# Patient Record
Sex: FEMALE | Race: BLACK OR AFRICAN AMERICAN
[De-identification: names, ages, dates, MRNs, and addresses within clinical notes are randomized per-mention and may not be internally consistent; named-entity substitution may affect disease eponyms.]

---

## 2023-03-06 ENCOUNTER — HOSPITAL ENCOUNTER (EMERGENCY)
Dept: HOSPITAL 26 - MED | Age: 58
Discharge: HOME | End: 2023-03-06
Payer: COMMERCIAL

## 2023-03-06 VITALS — BODY MASS INDEX: 18.96 KG/M2 | HEIGHT: 66 IN | WEIGHT: 118 LBS

## 2023-03-06 VITALS — DIASTOLIC BLOOD PRESSURE: 77 MMHG | SYSTOLIC BLOOD PRESSURE: 140 MMHG
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
## 2023-03-06 DIAGNOSIS — N39.0: ICD-10-CM

## 2023-03-06 DIAGNOSIS — S39.012A: Primary | ICD-10-CM

## 2023-03-06 DIAGNOSIS — Z79.2: ICD-10-CM
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
## 2023-03-06 DIAGNOSIS — R51.9: ICD-10-CM
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
## 2023-03-06 DIAGNOSIS — Z79.1: ICD-10-CM

## 2023-03-06 DIAGNOSIS — Z79.899: ICD-10-CM

## 2023-03-06 LAB
APPEARANCE UR: (no result)
BILIRUB UR QL STRIP: NEGATIVE
COLOR UR: YELLOW
GLUCOSE UR STRIP-MCNC: NEGATIVE MG/DL
HGB UR QL STRIP: (no result)
LEUKOCYTE ESTERASE UR QL STRIP: (no result)
NITRITE UR QL STRIP: NEGATIVE
PH UR STRIP: 6.5 [PH] (ref 5–9)
RBC #/AREA URNS HPF: (no result) /HPF (ref 0–5)
WBC,URINE: (no result) /HPF (ref 0–5)

## 2023-03-06 NOTE — NUR
57/F WALKED IN C/O HEADACHE ONSET 1WK ACCOMPANIED BY ELEVATED BP AT PCP OFFICE 
TODAY. PT DENIES NVD. DENIES BLURRY VISION. DENIES HX HTN.



PMH: DENIES

## 2023-03-06 NOTE — NUR
Patient discharged with v/s stable. Written and verbal after care instructions 
given and explained. 

Patient alert, oriented and verbalized understanding of instructions. 
Ambulatory with steady gait. All questions addressed prior to discharge. ID 
band removed. Patient advised to follow up with PMD. Rx of SALONPAS, MOTRIN, 
MACROBID given. Patient educated on indication of medication including possible 
reaction and side effects. Opportunity to ask questions provided and answered.